# Patient Record
(demographics unavailable — no encounter records)

---

## 2025-02-03 NOTE — DISCUSSION/SUMMARY
[de-identified] :  The underlying pathophysiology was reviewed with the patient. XR films were reviewed with the patient. Discussed at length the nature of the patient's condition.   Patient was advised to take OTC medications and topical analgesic for pain management.  Patient was advised to move his shoulder as much as possible to improve the ROM and prevent any loss of motion. Patient was advised move the shoulder and return to his baseline tasks with no restrictions.   The patient was encouraged to take Calcium Citrate, Vitamin D3 and Vitamin C along with a high calcium diet is advised to promote bone health.  All questions answered, understanding verbalized. Patient in agreement with plan of care.   Patient was advised to follow up as needed

## 2025-02-03 NOTE — PHYSICAL EXAM
[de-identified] :  Patient is WDWN, alert, and in no acute distress. Breathing is unlabored. He is grossly oriented to person, place, and time.  Left Shoulder:   Inspection/ Palpation: there is scapula body tenderness, no discoloration,swelling, or deformities.   Range of Motion: Flexion: Full  Strength:5/5.   Stability: no joint instability on provocative testing. [de-identified] :  AP, lateral and oblique views of the left shoulder were obtained today and revealed displaced scapula body fracture with 40 degrees apex medial angulation.  The fracture is well aligned and fully healed. No new fractures on today's xrays.

## 2025-02-03 NOTE — HISTORY OF PRESENT ILLNESS
[de-identified] : Pt is a 39 y/o male c/o left shoulder pain x 1 week.  He fell directly on the shoulder while playing basketball.  He had pain immediately.  He had a scapula fracture 11 months ago and he states that he feels pain in the area that the fracture was.  It is not severe but it has not improved.